# Patient Record
Sex: FEMALE | Race: BLACK OR AFRICAN AMERICAN | Employment: FULL TIME | URBAN - METROPOLITAN AREA
[De-identification: names, ages, dates, MRNs, and addresses within clinical notes are randomized per-mention and may not be internally consistent; named-entity substitution may affect disease eponyms.]

---

## 2014-10-09 LAB — HIV AG/AB: NORMAL

## 2017-05-11 LAB — ANTIBODY: NORMAL

## 2018-12-02 ENCOUNTER — APPOINTMENT (OUTPATIENT)
Dept: GENERAL RADIOLOGY | Age: 33
End: 2018-12-02
Payer: COMMERCIAL

## 2018-12-02 ENCOUNTER — APPOINTMENT (OUTPATIENT)
Dept: CT IMAGING | Age: 33
End: 2018-12-02
Payer: COMMERCIAL

## 2018-12-02 ENCOUNTER — HOSPITAL ENCOUNTER (EMERGENCY)
Age: 33
Discharge: HOME | End: 2018-12-03
Attending: EMERGENCY MEDICINE
Payer: COMMERCIAL

## 2018-12-02 VITALS
SYSTOLIC BLOOD PRESSURE: 107 MMHG | TEMPERATURE: 98 F | WEIGHT: 139.77 LBS | BODY MASS INDEX: 23.86 KG/M2 | RESPIRATION RATE: 18 BRPM | DIASTOLIC BLOOD PRESSURE: 72 MMHG | OXYGEN SATURATION: 100 % | HEART RATE: 96 BPM | HEIGHT: 64 IN

## 2018-12-02 DIAGNOSIS — M54.2 CERVICAL PAIN: ICD-10-CM

## 2018-12-02 DIAGNOSIS — R55 SYNCOPE, UNSPECIFIED SYNCOPE TYPE: Primary | ICD-10-CM

## 2018-12-02 LAB
A/G RATIO: 1.1 (ref 1.1–2.2)
ALBUMIN SERPL-MCNC: 4 G/DL (ref 3.4–5)
ALP BLD-CCNC: 39 U/L (ref 40–129)
ALT SERPL-CCNC: 9 U/L (ref 10–40)
ANION GAP SERPL CALCULATED.3IONS-SCNC: 14 MMOL/L (ref 3–16)
AST SERPL-CCNC: 12 U/L (ref 15–37)
BILIRUB SERPL-MCNC: 0.6 MG/DL (ref 0–1)
BUN BLDV-MCNC: 13 MG/DL (ref 7–20)
CALCIUM SERPL-MCNC: 9.1 MG/DL (ref 8.3–10.6)
CHLORIDE BLD-SCNC: 104 MMOL/L (ref 99–110)
CO2: 20 MMOL/L (ref 21–32)
CREAT SERPL-MCNC: 0.8 MG/DL (ref 0.6–1.1)
GFR AFRICAN AMERICAN: >60
GFR NON-AFRICAN AMERICAN: >60
GLOBULIN: 3.8 G/DL
GLUCOSE BLD-MCNC: 99 MG/DL (ref 70–99)
HCG QUALITATIVE: NEGATIVE
HCT VFR BLD CALC: 38.4 % (ref 36–48)
HEMOGLOBIN: 12.6 G/DL (ref 12–16)
MCH RBC QN AUTO: 30.1 PG (ref 26–34)
MCHC RBC AUTO-ENTMCNC: 32.8 G/DL (ref 31–36)
MCV RBC AUTO: 91.7 FL (ref 80–100)
PDW BLD-RTO: 12.9 % (ref 12.4–15.4)
PLATELET # BLD: 258 K/UL (ref 135–450)
PMV BLD AUTO: 8.8 FL (ref 5–10.5)
POTASSIUM SERPL-SCNC: 3.5 MMOL/L (ref 3.5–5.1)
RBC # BLD: 4.19 M/UL (ref 4–5.2)
SODIUM BLD-SCNC: 138 MMOL/L (ref 136–145)
TOTAL PROTEIN: 7.8 G/DL (ref 6.4–8.2)
TROPONIN: <0.01 NG/ML
WBC # BLD: 7.3 K/UL (ref 4–11)

## 2018-12-02 PROCEDURE — 2580000003 HC RX 258: Performed by: PHYSICIAN ASSISTANT

## 2018-12-02 PROCEDURE — 96361 HYDRATE IV INFUSION ADD-ON: CPT

## 2018-12-02 PROCEDURE — 80053 COMPREHEN METABOLIC PANEL: CPT

## 2018-12-02 PROCEDURE — 99284 EMERGENCY DEPT VISIT MOD MDM: CPT

## 2018-12-02 PROCEDURE — 71046 X-RAY EXAM CHEST 2 VIEWS: CPT

## 2018-12-02 PROCEDURE — 84703 CHORIONIC GONADOTROPIN ASSAY: CPT

## 2018-12-02 PROCEDURE — 96374 THER/PROPH/DIAG INJ IV PUSH: CPT

## 2018-12-02 PROCEDURE — 96375 TX/PRO/DX INJ NEW DRUG ADDON: CPT

## 2018-12-02 PROCEDURE — 70450 CT HEAD/BRAIN W/O DYE: CPT

## 2018-12-02 PROCEDURE — 84484 ASSAY OF TROPONIN QUANT: CPT

## 2018-12-02 PROCEDURE — 72125 CT NECK SPINE W/O DYE: CPT

## 2018-12-02 PROCEDURE — 85027 COMPLETE CBC AUTOMATED: CPT

## 2018-12-02 PROCEDURE — 93005 ELECTROCARDIOGRAM TRACING: CPT | Performed by: EMERGENCY MEDICINE

## 2018-12-02 PROCEDURE — 6360000002 HC RX W HCPCS: Performed by: PHYSICIAN ASSISTANT

## 2018-12-02 RX ORDER — NAPROXEN 500 MG/1
500 TABLET ORAL 2 TIMES DAILY WITH MEALS
Qty: 20 TABLET | Refills: 0 | Status: SHIPPED | OUTPATIENT
Start: 2018-12-02 | End: 2018-12-12

## 2018-12-02 RX ORDER — ONDANSETRON 2 MG/ML
4 INJECTION INTRAMUSCULAR; INTRAVENOUS ONCE
Status: COMPLETED | OUTPATIENT
Start: 2018-12-02 | End: 2018-12-02

## 2018-12-02 RX ORDER — 0.9 % SODIUM CHLORIDE 0.9 %
1000 INTRAVENOUS SOLUTION INTRAVENOUS ONCE
Status: COMPLETED | OUTPATIENT
Start: 2018-12-02 | End: 2018-12-02

## 2018-12-02 RX ORDER — KETOROLAC TROMETHAMINE 30 MG/ML
15 INJECTION, SOLUTION INTRAMUSCULAR; INTRAVENOUS ONCE
Status: COMPLETED | OUTPATIENT
Start: 2018-12-02 | End: 2018-12-02

## 2018-12-02 RX ORDER — DIPHENHYDRAMINE HYDROCHLORIDE 50 MG/ML
25 INJECTION INTRAMUSCULAR; INTRAVENOUS ONCE
Status: COMPLETED | OUTPATIENT
Start: 2018-12-02 | End: 2018-12-02

## 2018-12-02 RX ORDER — CYCLOBENZAPRINE HCL 10 MG
10 TABLET ORAL NIGHTLY PRN
Qty: 10 TABLET | Refills: 0 | Status: SHIPPED | OUTPATIENT
Start: 2018-12-02

## 2018-12-02 RX ORDER — ORPHENADRINE CITRATE 30 MG/ML
30 INJECTION INTRAMUSCULAR; INTRAVENOUS ONCE
Status: COMPLETED | OUTPATIENT
Start: 2018-12-02 | End: 2018-12-02

## 2018-12-02 RX ADMIN — SODIUM CHLORIDE 1000 ML: 9 INJECTION, SOLUTION INTRAVENOUS at 22:35

## 2018-12-02 RX ADMIN — ONDANSETRON 4 MG: 2 INJECTION INTRAMUSCULAR; INTRAVENOUS at 22:56

## 2018-12-02 RX ADMIN — KETOROLAC TROMETHAMINE 15 MG: 30 INJECTION, SOLUTION INTRAMUSCULAR at 22:37

## 2018-12-02 RX ADMIN — DIPHENHYDRAMINE HYDROCHLORIDE 25 MG: 50 INJECTION, SOLUTION INTRAMUSCULAR; INTRAVENOUS at 22:42

## 2018-12-02 RX ADMIN — ORPHENADRINE CITRATE 30 MG: 30 INJECTION INTRAMUSCULAR; INTRAVENOUS at 22:39

## 2018-12-02 ASSESSMENT — PAIN DESCRIPTION - ORIENTATION: ORIENTATION: LEFT

## 2018-12-02 ASSESSMENT — PAIN DESCRIPTION - PAIN TYPE: TYPE: ACUTE PAIN

## 2018-12-02 ASSESSMENT — PAIN DESCRIPTION - LOCATION: LOCATION: NECK;SHOULDER

## 2018-12-02 ASSESSMENT — PAIN SCALES - GENERAL
PAINLEVEL_OUTOF10: 8
PAINLEVEL_OUTOF10: 8
PAINLEVEL_OUTOF10: 3

## 2018-12-03 ASSESSMENT — ENCOUNTER SYMPTOMS
NAUSEA: 0
VOMITING: 0
SHORTNESS OF BREATH: 0
COLOR CHANGE: 0
ABDOMINAL PAIN: 0
BACK PAIN: 0

## 2018-12-03 NOTE — ED NOTES
Bed: A-17  Expected date:   Expected time:   Means of arrival:   Comments:  Green  35F  Syncope & Weakness     Sanjay Hoang, ANN  12/02/18 2837

## 2018-12-03 NOTE — ED TRIAGE NOTES
C/o left shoulder and neck pain for 2 days. States stood up and passed out. States felt weak, diaphoretic and nauseous beforehand.

## 2018-12-06 PROCEDURE — 93010 ELECTROCARDIOGRAM REPORT: CPT | Performed by: INTERNAL MEDICINE

## 2018-12-26 LAB
EKG ATRIAL RATE: 75 BPM
EKG DIAGNOSIS: NORMAL
EKG P AXIS: 65 DEGREES
EKG P-R INTERVAL: 132 MS
EKG Q-T INTERVAL: 370 MS
EKG QRS DURATION: 76 MS
EKG QTC CALCULATION (BAZETT): 413 MS
EKG R AXIS: 49 DEGREES
EKG T AXIS: 18 DEGREES
EKG VENTRICULAR RATE: 75 BPM

## 2020-11-03 LAB — PAP SMEAR, EXTERNAL: NORMAL

## 2022-04-11 ENCOUNTER — HOSPITAL ENCOUNTER (EMERGENCY)
Age: 37
Discharge: HOME | End: 2022-04-11
Attending: EMERGENCY MEDICINE
Payer: COMMERCIAL

## 2022-04-11 VITALS
HEIGHT: 64 IN | TEMPERATURE: 98.3 F | SYSTOLIC BLOOD PRESSURE: 108 MMHG | HEART RATE: 90 BPM | RESPIRATION RATE: 16 BRPM | DIASTOLIC BLOOD PRESSURE: 71 MMHG | BODY MASS INDEX: 25.52 KG/M2 | WEIGHT: 149.47 LBS | OXYGEN SATURATION: 100 %

## 2022-04-11 DIAGNOSIS — R60.9 DEPENDENT EDEMA: Primary | ICD-10-CM

## 2022-04-11 PROCEDURE — 99283 EMERGENCY DEPT VISIT LOW MDM: CPT

## 2022-04-11 ASSESSMENT — ENCOUNTER SYMPTOMS
COLOR CHANGE: 1
EYE PAIN: 0
EYE REDNESS: 0
WHEEZING: 0
SHORTNESS OF BREATH: 0
COUGH: 0

## 2022-04-11 NOTE — ED PROVIDER NOTES
ED Attending Attestation Note    This patient was seen by the advanced practice provider. I personally saw the patient and performed a substantive portion of the visit including all aspects of the medical decision making. Briefly, 43 y.o. female presents with complaints of swelling of the ankles as well as intermittent heaviness of the ankles. States that she is been dealing with swollen feet and ankles over the past 2 years. 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 Denies any pain currently. Denies any unilateral swelling. No fevers or chills. Focused exam:   Gen: 43 y.o. female, NAD  HEENT: NCAT. PERRL. EOMI. CV: RRR w/o MRG  Lungs: CTAB. No incr WOB. Abdomen: Soft, nontender, nondistended. No rebound/guarding. MSK: 1+ edema bilateral ankles, chronic skin changes of the ankles from peripheral vascular disease, DP pulse 2+ bilaterally, no calf tenderness bilaterally    MDM:   Patient seen and evaluated. History and physical as above. Nontoxic afebrile. Patient with bilateral lower extremity swelling intermittently over the past 2 years. Does have some chronic skin changes on the ankles bilaterally likely from peripheral vascular disease and leaky veins. Discussed the importance of elevating her legs when she is off of her feet as well as compression stockings throughout the day. Also stressed importance of follow-up with her primary care physician or with vascular surgery if she chooses to do so. Return instruction provided. Questions answered prior to discharge. For further details of the patient's emergency department visit, please see the advanced practice provider's documentation. Cora Miller MD     This report has been produced using speech recognition software and may contain errors related to that system including errors in grammar, punctuation, and spelling, as well as words and phrases that may be inappropriate.  If there are any questions or concerns please feel free to contact the dictating provider for clarification.         Julio Cesar Mina MD  04/11/22 5528

## 2022-04-11 NOTE — ED PROVIDER NOTES
[facility]        Pt Name: Herb Petersen  MRN: [id number]  [place] 1985  Date of evaluation: 4/11/2022  Provider: Portillo Mcgregor PA-C  PCP: No primary care provider on file. Note Started: 7:44 PM EDT       I have seen and evaluated this patient with my supervising physician Senait Lugo 113       Chief Complaint   Patient presents with    Leg Pain         HISTORY OF PRESENT ILLNESS   (Location/Symptom, Timing/Onset, Context/Setting, Quality, Duration, Modifying Factors, Severity)  Note limiting factors. Chief Complaint: Right leg stiffness, change in skin color    Herb Petersen is a 43 y.o. female who presents to the emergency department with complaint of right ankle stiffness that has been present for the past 2 weeks. She states that she tried to make an appointment to follow-up with her primary care physician, however she could not get in and that is what brings her to the emergency department today. She also indicates that there has been a change in her skin color that has been present for about the same amount of time as the skin change. She does have bilateral leg stiffness. 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 At this time she denies fever, chills, pain in her leg. Nursing Notes were all reviewed and agreed with or any disagreements were addressed in the HPI. REVIEW OF SYSTEMS    (2-9 systems for level 4, 10 or more for level 5)     Review of Systems   Constitutional: Negative for chills and fever. Eyes: Negative for pain and redness. Respiratory: Negative for cough, shortness of breath and wheezing. Cardiovascular: Negative for leg swelling. Musculoskeletal: Negative for gait problem and joint swelling. Skin: Positive for color change. Negative for rash and wound.        PAST MEDICAL HISTORY     Past Medical History:   Diagnosis Date  Acne     Asthma     Dysuria     Nausea     Unspecified contraceptive management        SURGICAL HISTORY   History reviewed. No pertinent surgical history. CURRENTMEDICATIONS       Previous Medications    CYCLOBENZAPRINE (FLEXERIL) 10 MG TABLET    Take 1 tablet by mouth nightly as needed for Muscle spasms Sedation precautions    NAPROXEN (NAPROSYN) 500 MG TABLET    Take 1 tablet by mouth 2 times daily (with meals) for 20 doses Do not take with ibuprofen or other NSAIDs or anti-inflammatories       ALLERGIES     Patient has no known allergies. FAMILYHISTORY     History reviewed. No pertinent family history.      SOCIAL HISTORY       Social History     Socioeconomic History    Marital status:      Spouse name: None    Number of children: 1    Years of education: None    Highest education level: None   Occupational History    None   Tobacco Use    Smoking status: Never Smoker    Smokeless tobacco: Never Used   Substance and Sexual Activity    Alcohol use: Yes     Comment: social    Drug use: No    Sexual activity: None   Other Topics Concern    None   Social History Narrative    Past Medical History      Onset of First Menses at Age: 13, : 1, Para: 1, Miscarriage(s): 0, (s): 0, # Vaginal Deliveries: 1                                        Last updated by Maliha Torrez MD on 2010 3:46:58 PM             Past Surgical History                                                                         Social History     Marital Status: engaged,   Children: 1,   Employment Status: employed full-time,   Employer: [employer],   Occupation: truancy officer    Alcohol Use: socially    weekends    Drug Use: none    Tobacco Usage:non-smoker                                                        Last updated by Maliha Torrez MD on 2010 3:46:58 PM             Family History     mother -     father -         brother - Lenin Mayen -         sister - Andrey Bailey"        daughter - 2006 - Colleen Garza -                                           Last updated by Lilian Briscoe MD on 04/13/2010 3:46:58 PM                         Social Determinants of Health     Financial Resource Strain:     Difficulty of Paying Living Expenses: Not on file   Food Insecurity:     Worried About Running Out of Food in the Last Year: Not on file    Remington of Food in the Last Year: Not on file   Transportation Needs:     Lack of Transportation (Medical): Not on file    Lack of Transportation (Non-Medical): Not on file   Physical Activity:     Days of Exercise per Week: Not on file    Minutes of Exercise per Session: Not on file   Stress:     Feeling of Stress : Not on file   Social Connections:     Frequency of Communication with Friends and Family: Not on file    Frequency of Social Gatherings with Friends and Family: Not on file    Attends Tenriism Services: Not on file    Active Member of [address] [place] [address] [employer] or Organizations: Not on file    Attends Club or Organization Meetings: Not on file    Marital Status: Not on file   Intimate Partner Violence:     Fear of Current or Ex-Partner: Not on file    Emotionally Abused: Not on file    Physically Abused: Not on file    Sexually Abused: Not on file   Housing Stability:     Unable to Pay for Housing in the Last Year: Not on file    Number of [place] in the Last Year: Not on file    Unstable Housing in the Last Year: Not on file       SCREENINGS    Troy Coma Scale  Eye Opening: Spontaneous  Best Verbal Response: Oriented  Best Motor Response: Obeys commands  Troy Coma Scale Score: 15        PHYSICAL EXAM    (up to 7 for level 4, 8 or more for level 5)     ED Triage Vitals [04/11/22 1859]   BP Temp Temp Source Pulse Resp SpO2 Height Weight   108/71 98.3 °F (36.8 °C) Temporal 90 16 100 % 5' 4\" (1.626 m) 149 lb 7.6 oz (67.8 kg)       Physical Exam  Constitutional:       General: She is not in acute distress. Appearance: Normal appearance.  She is not ill-appearing, toxic-appearing or diaphoretic. HENT:      Head: Normocephalic and atraumatic. Right Ear: External ear normal.      Left Ear: External ear normal.      Nose: Nose normal.   Eyes:      General:         Right eye: No discharge. Left eye: No discharge. Pulmonary:      Effort: Pulmonary effort is normal. No respiratory distress. Musculoskeletal:         General: Normal range of motion. Cervical back: Normal range of motion. Comments: Patient has normal active range of motion and strength against resistance in bilateral ankles  Normal sensation distally, normal dorsalis pedis pulse bilaterally  Patient has mild edema to bilateral ankles  + Skin change in right ankle, mottling of skin consistent with peripheral vascular disease    Skin:     General: Skin is warm and dry. Neurological:      General: No focal deficit present. Mental Status: She is alert and oriented to person, place, and time. Psychiatric:         Mood and Affect: Mood normal.         Behavior: Behavior normal.         DIAGNOSTIC RESULTS   LABS:    Labs Reviewed - No data to display    When ordered, only abnormal lab results are displayed. All other labs were within normal range or not returned as of this dictation. EKG: When ordered, EKG's are interpreted by the Emergency Department Physician in the absence of a cardiologist.  Please see their note for interpretation of EKG. RADIOLOGY:   Non-plain film images such as CT, Ultrasound and MRI are read by the radiologist. Plain radiographic images are visualized andpreliminarily interpreted by the  ED Provider with the below findings:        Interpretation perthe Radiologist below, if available at the time of this note:    No orders to display     No results found.       PROCEDURES   Unless otherwise noted below, none     Procedures    CRITICAL CARE TIME   N/A    CONSULTS:  None      EMERGENCY DEPARTMENT COURSE and DIFFERENTIAL DIAGNOSIS/MDM: Vitals:    Vitals:    04/11/22 1859   BP: 108/71   Pulse: 90   Resp: 16   Temp: 98.3 °F (36.8 °C)   TempSrc: Temporal   SpO2: 100%   Weight: 149 lb 7.6 oz (67.8 kg)   Height: 5' 4\" (1.626 m)       Patient was given thefollowing medications:  Medications - No data to display        This is a 13-year-old female presents emergency department with complaint of bilateral ankle stiffness, skin changes in right ankle that is been present for the past 2 weeks. Vitals upon arrival are within normal limits. I did discuss this patient with my attending, Dr. 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 I discussed with patient wearing compression stockings as well as keeping her legs elevated at nighttime and she was agreeable to this plan. I did give her a referral for a vascular surgeon as well as a primary care physician as she is not fond of her PCP at this point. We discussed return to the emergency department if any new worsening or more concerning symptoms present. Patient was agreeable to this plan. She was discharged in stable condition. At this time I have low suspicion for life-threatening disease, including DVT as patient is Wells score is negative 2, other. FINAL IMPRESSION      1. Dependent edema          DISPOSITION/PLAN   DISPOSITION Decision To Discharge 04/11/2022 07:35:51 PM      PATIENT REFERREDTO:  [facility]) Pre-Services  [phone]  Schedule an appointment as soon as possible for a visit in 2 days  for reevaluation    Alison Moreno MD  [facility].  [address]    Schedule an appointment as soon as possible for a visit in 2 days  for reevaluation    [address] [facility] Emergency Department  [address] [address]  [phone]  Go to   As needed, If symptoms worsen      DISCHARGE MEDICATIONS:  New Prescriptions    No medications on file       DISCONTINUED MEDICATIONS:  Discontinued Medications    No medications on file              (Please note that portions ofthis note were completed with a voice recognition program.  Efforts were made to edit the dictations but occasionally words are mis-transcribed.)    Carlos Clark PA-C (electronically signed)              Carlos Clark PA-C  04/11/22 194

## 2023-01-09 ENCOUNTER — TELEPHONE (OUTPATIENT)
Dept: INTERNAL MEDICINE CLINIC | Age: 38
End: 2023-01-09

## 2023-01-09 NOTE — TELEPHONE ENCOUNTER
----- Message from Carlito Shetty Dr sent at 1/6/2023 12:05 PM EST -----  Subject: Appointment Request    Reason for Call: New Patient/New to Provider Appointment needed: New   Patient Request Appointment    QUESTIONS    Reason for appointment request? No appointments available during search     Additional Information for Provider? Patient calling to establish with Dr Hannah Mares.  Please call to discuss appt options  ---------------------------------------------------------------------------  --------------  Neil GREENBERG  [phone]; OK to leave message on voicemail  ---------------------------------------------------------------------------  --------------  SCRIPT ANSWERS  COVID Screen: Stella Doran

## 2023-06-23 ENCOUNTER — OFFICE VISIT (OUTPATIENT)
Dept: PRIMARY CARE CLINIC | Age: 38
End: 2023-06-23
Payer: COMMERCIAL

## 2023-06-23 VITALS
OXYGEN SATURATION: 98 % | WEIGHT: 152 LBS | TEMPERATURE: 97.9 F | HEIGHT: 64 IN | SYSTOLIC BLOOD PRESSURE: 120 MMHG | HEART RATE: 75 BPM | DIASTOLIC BLOOD PRESSURE: 78 MMHG | BODY MASS INDEX: 25.95 KG/M2

## 2023-06-23 DIAGNOSIS — J45.990 ASTHMA, EXERCISE INDUCED: ICD-10-CM

## 2023-06-23 DIAGNOSIS — Z13.220 SCREENING CHOLESTEROL LEVEL: ICD-10-CM

## 2023-06-23 DIAGNOSIS — Z13.1 DIABETES MELLITUS SCREENING: ICD-10-CM

## 2023-06-23 DIAGNOSIS — K59.00 CONSTIPATION, UNSPECIFIED CONSTIPATION TYPE: Primary | ICD-10-CM

## 2023-06-23 DIAGNOSIS — Z11.59 ENCOUNTER FOR HEPATITIS C SCREENING TEST FOR LOW RISK PATIENT: ICD-10-CM

## 2023-06-23 PROBLEM — R87.810 CERVICAL HIGH RISK HPV (HUMAN PAPILLOMAVIRUS) TEST POSITIVE: Status: RESOLVED | Noted: 2019-04-05 | Resolved: 2023-06-23

## 2023-06-23 PROBLEM — R87.810 CERVICAL HIGH RISK HPV (HUMAN PAPILLOMAVIRUS) TEST POSITIVE: Status: ACTIVE | Noted: 2019-04-05

## 2023-06-23 PROCEDURE — 99204 OFFICE O/P NEW MOD 45 MIN: CPT | Performed by: FAMILY MEDICINE

## 2023-06-23 RX ORDER — ALBUTEROL SULFATE 90 UG/1
2 AEROSOL, METERED RESPIRATORY (INHALATION) 4 TIMES DAILY PRN
Qty: 54 G | Refills: 1 | Status: SHIPPED | OUTPATIENT
Start: 2023-06-23

## 2023-06-23 SDOH — ECONOMIC STABILITY: INCOME INSECURITY: HOW HARD IS IT FOR YOU TO PAY FOR THE VERY BASICS LIKE FOOD, HOUSING, MEDICAL CARE, AND HEATING?: NOT HARD AT ALL

## 2023-06-23 SDOH — ECONOMIC STABILITY: FOOD INSECURITY: WITHIN THE PAST 12 MONTHS, THE FOOD YOU BOUGHT JUST DIDN'T LAST AND YOU DIDN'T HAVE MONEY TO GET MORE.: NEVER TRUE

## 2023-06-23 SDOH — ECONOMIC STABILITY: FOOD INSECURITY: WITHIN THE PAST 12 MONTHS, YOU WORRIED THAT YOUR FOOD WOULD RUN OUT BEFORE YOU GOT MONEY TO BUY MORE.: NEVER TRUE

## 2023-06-23 SDOH — ECONOMIC STABILITY: HOUSING INSECURITY
IN THE LAST 12 MONTHS, WAS THERE A TIME WHEN YOU DID NOT HAVE A STEADY PLACE TO SLEEP OR SLEPT IN A SHELTER (INCLUDING NOW)?: NO

## 2023-06-23 ASSESSMENT — ENCOUNTER SYMPTOMS
ABDOMINAL PAIN: 0
RHINORRHEA: 0
VOMITING: 0
COUGH: 0
COLOR CHANGE: 0
DIARRHEA: 0
NAUSEA: 0
SHORTNESS OF BREATH: 0
CONSTIPATION: 1
BLOOD IN STOOL: 0

## 2023-06-23 ASSESSMENT — PATIENT HEALTH QUESTIONNAIRE - PHQ9
SUM OF ALL RESPONSES TO PHQ QUESTIONS 1-9: 0
1. LITTLE INTEREST OR PLEASURE IN DOING THINGS: 0
SUM OF ALL RESPONSES TO PHQ9 QUESTIONS 1 & 2: 0
SUM OF ALL RESPONSES TO PHQ QUESTIONS 1-9: 0
2. FEELING DOWN, DEPRESSED OR HOPELESS: 0

## 2023-06-23 NOTE — ASSESSMENT & PLAN NOTE
Poorly controlled and has tried most of the measures I would recommend already. No alarm sx. Possibly IBS-C but needs eval (gastroparesis, functional issue, mass less likely but on DDx). W Will refer to GI for eval - may need colonoscopy. TSH check today as well and do preventative labs to r/o diabetes etc. Call back/ED precautions discussed.

## 2023-06-23 NOTE — PROGRESS NOTES
Aurea Alvarado is a 42y.o. year old female here for:    Chief Complaint:    Chief Complaint   Patient presents with    Constipation     Subjective: Today, her current concerns include:    HPI:  #Constipation  - Onset: All her life she feels  - Context: Denied person or fam hx of colon CA. Has changed diet, eliminated dairy  - Quality: No pain or blood with BM.   - Severity: No pain  - Timing/Duration: Constant and daily - BM once per week  - Progression: Worsening  - Modifiers: Fiber, Miralax, Metamucil, water (80 oz per day or so). Working out 60 mins per day for 6 days per week. Occasionally uses laxatives. - Associated Symptoms: Per ROS as otherwise stated in this note    Past Medical History:   Diagnosis Date    Acne     Asthma     Exercise induced    Cervical high risk HPV (human papillomavirus) test positive 4/5/2019    Dysuria     Nausea     Unspecified contraceptive management      Social History     Tobacco Use    Smoking status: Never    Smokeless tobacco: Never   Substance Use Topics    Alcohol use: Not Currently    Drug use: No     Family History   Problem Relation Age of Onset    Breast Cancer Neg Hx     Colon Cancer Neg Hx      Past Surgical History:   Procedure Laterality Date    TUBAL LIGATION      WISDOM TOOTH EXTRACTION           Current Outpatient Medications:     albuterol sulfate HFA (VENTOLIN HFA) 108 (90 Base) MCG/ACT inhaler, Inhale 2 puffs into the lungs 4 times daily as needed for Wheezing, Disp: 54 g, Rfl: 1    cyclobenzaprine (FLEXERIL) 10 MG tablet, Take 1 tablet by mouth nightly as needed for Muscle spasms Sedation precautions, Disp: 10 tablet, Rfl: 0  No Known Allergies    Review of Systems:  Review of Systems   Constitutional:  Negative for chills, diaphoresis (enied night sweats), fever and unexpected weight change (denied weight loss). HENT:  Negative for congestion and rhinorrhea. Respiratory:  Negative for cough and shortness of breath.     Cardiovascular:  Negative

## 2023-06-23 NOTE — PATIENT INSTRUCTIONS
Please consider pneumonia vaccine. Please find our [facility] below for your convenience! CENTRAL LOCATIONS    1) [address], [address]  Phone: [phone]    2) [address]  [facility], [address]  Phone: [place]    3) [address], [address]  [address]  Phone: [phone]    4) [facility], [address]  Phone: [phone]    5) [facility] [address] [facility] [address]  [address]ena Leon [address]  Phone: [address]    6) [address] [facility], [address] [facility], [address]  Phone: [phone]    7) Lucas [address] [facility] [address]  Phone: [phone]) [facility] [address]  [place], [address]  Phone: [phone]) [address] [place] [address] [address], [address]. [place] [address] [facility]  Phone: [place]    10) [facility]  [address] [facility] [address] [address], [address] [facility]  Phone: [phone]) [address]  3/3/2003 [address] [facility], [address] [facility] [address]  Phone: [phone] [facility]    12) [address] [facility] [address], [address]  [address], [address] [facility]  Phone: [phone]    13) [facility]   [address]  Dionne Holiday, [address]  Phone: [phone]

## 2023-06-23 NOTE — ASSESSMENT & PLAN NOTE
Well controlled. Denied recent H/I/E. Declined PCV 20 offered. Refilled inhaler per orders. Call back/ED precautions discussed.

## 2023-07-06 ENCOUNTER — TELEPHONE (OUTPATIENT)
Dept: PRIMARY CARE CLINIC | Age: 38
End: 2023-07-06

## 2023-07-06 NOTE — TELEPHONE ENCOUNTER
Fax received from [facility] stating they have attempted to reach patient leaving several messages but patient have not followed up regarding referral.     Telephone call placed to patient, no answer. LVM instructing patient to return call.

## 2023-07-23 PROBLEM — Z13.1 DIABETES MELLITUS SCREENING: Status: RESOLVED | Noted: 2023-06-23 | Resolved: 2023-07-23

## 2023-07-23 PROBLEM — Z13.220 SCREENING CHOLESTEROL LEVEL: Status: RESOLVED | Noted: 2023-06-23 | Resolved: 2023-07-23

## 2023-07-23 PROBLEM — Z11.59 ENCOUNTER FOR HEPATITIS C SCREENING TEST FOR LOW RISK PATIENT: Status: RESOLVED | Noted: 2023-06-23 | Resolved: 2023-07-23

## 2023-10-13 DIAGNOSIS — Z11.59 ENCOUNTER FOR HEPATITIS C SCREENING TEST FOR LOW RISK PATIENT: ICD-10-CM

## 2023-10-13 DIAGNOSIS — K59.00 CONSTIPATION, UNSPECIFIED CONSTIPATION TYPE: ICD-10-CM

## 2023-10-13 DIAGNOSIS — Z13.220 SCREENING CHOLESTEROL LEVEL: ICD-10-CM

## 2023-10-13 DIAGNOSIS — Z13.1 DIABETES MELLITUS SCREENING: ICD-10-CM

## 2023-10-13 LAB
ALBUMIN SERPL-MCNC: 4.6 G/DL (ref 3.4–5)
ALBUMIN/GLOB SERPL: 1.4 {RATIO} (ref 1.1–2.2)
ALP SERPL-CCNC: 49 U/L (ref 40–129)
ALT SERPL-CCNC: 9 U/L (ref 10–40)
ANION GAP SERPL CALCULATED.3IONS-SCNC: 15 MMOL/L (ref 3–16)
AST SERPL-CCNC: 15 U/L (ref 15–37)
BILIRUB SERPL-MCNC: 0.8 MG/DL (ref 0–1)
BUN SERPL-MCNC: 12 MG/DL (ref 7–20)
CALCIUM SERPL-MCNC: 9 MG/DL (ref 8.3–10.6)
CHLORIDE SERPL-SCNC: 100 MMOL/L (ref 99–110)
CHOLEST SERPL-MCNC: 245 MG/DL (ref 0–199)
CO2 SERPL-SCNC: 23 MMOL/L (ref 21–32)
CREAT SERPL-MCNC: 0.9 MG/DL (ref 0.6–1.1)
GFR SERPLBLD CREATININE-BSD FMLA CKD-EPI: >60 ML/MIN/{1.73_M2}
GLUCOSE SERPL-MCNC: 78 MG/DL (ref 70–99)
HCV AB SERPL QL IA: NORMAL
HDLC SERPL-MCNC: 70 MG/DL (ref 40–60)
LDLC SERPL CALC-MCNC: 164 MG/DL
POTASSIUM SERPL-SCNC: 4 MMOL/L (ref 3.5–5.1)
PROT SERPL-MCNC: 7.9 G/DL (ref 6.4–8.2)
SODIUM SERPL-SCNC: 138 MMOL/L (ref 136–145)
TRIGL SERPL-MCNC: 53 MG/DL (ref 0–150)
TSH SERPL DL<=0.005 MIU/L-ACNC: 3.33 UIU/ML (ref 0.27–4.2)
VLDLC SERPL CALC-MCNC: 11 MG/DL

## 2023-10-14 LAB
EST. AVERAGE GLUCOSE BLD GHB EST-MCNC: 91.1 MG/DL
HBA1C MFR BLD: 4.8 %

## 2024-04-05 DIAGNOSIS — L60.8 DEFORMITY OF TOENAIL: Primary | ICD-10-CM

## 2024-06-10 ENCOUNTER — OFFICE VISIT (OUTPATIENT)
Dept: PRIMARY CARE CLINIC | Age: 39
End: 2024-06-10
Payer: COMMERCIAL

## 2024-06-10 VITALS
SYSTOLIC BLOOD PRESSURE: 102 MMHG | BODY MASS INDEX: 25.03 KG/M2 | HEART RATE: 71 BPM | HEIGHT: 64 IN | TEMPERATURE: 98.6 F | DIASTOLIC BLOOD PRESSURE: 62 MMHG | RESPIRATION RATE: 17 BRPM | WEIGHT: 146.6 LBS | OXYGEN SATURATION: 99 %

## 2024-06-10 DIAGNOSIS — Z12.31 ENCOUNTER FOR SCREENING MAMMOGRAM FOR MALIGNANT NEOPLASM OF BREAST: ICD-10-CM

## 2024-06-10 DIAGNOSIS — Z11.59 NEED FOR HEPATITIS B SCREENING TEST: ICD-10-CM

## 2024-06-10 DIAGNOSIS — R23.3 EASY BRUISING: Primary | ICD-10-CM

## 2024-06-10 PROBLEM — K59.00 CONSTIPATION: Status: RESOLVED | Noted: 2023-06-23 | Resolved: 2024-06-10

## 2024-06-10 PROCEDURE — 99214 OFFICE O/P EST MOD 30 MIN: CPT | Performed by: FAMILY MEDICINE

## 2024-06-10 SDOH — ECONOMIC STABILITY: INCOME INSECURITY: HOW HARD IS IT FOR YOU TO PAY FOR THE VERY BASICS LIKE FOOD, HOUSING, MEDICAL CARE, AND HEATING?: NOT HARD AT ALL

## 2024-06-10 SDOH — ECONOMIC STABILITY: FOOD INSECURITY: WITHIN THE PAST 12 MONTHS, YOU WORRIED THAT YOUR FOOD WOULD RUN OUT BEFORE YOU GOT MONEY TO BUY MORE.: NEVER TRUE

## 2024-06-10 SDOH — ECONOMIC STABILITY: FOOD INSECURITY: WITHIN THE PAST 12 MONTHS, THE FOOD YOU BOUGHT JUST DIDN'T LAST AND YOU DIDN'T HAVE MONEY TO GET MORE.: NEVER TRUE

## 2024-06-10 ASSESSMENT — ENCOUNTER SYMPTOMS
ABDOMINAL PAIN: 0
SHORTNESS OF BREATH: 0
BLOOD IN STOOL: 0
DIARRHEA: 0
RHINORRHEA: 0
COLOR CHANGE: 0
COUGH: 0
VOMITING: 0
NAUSEA: 0

## 2024-06-10 ASSESSMENT — PATIENT HEALTH QUESTIONNAIRE - PHQ9
SUM OF ALL RESPONSES TO PHQ QUESTIONS 1-9: 0
SUM OF ALL RESPONSES TO PHQ9 QUESTIONS 1 & 2: 0
SUM OF ALL RESPONSES TO PHQ QUESTIONS 1-9: 0
SUM OF ALL RESPONSES TO PHQ QUESTIONS 1-9: 0
2. FEELING DOWN, DEPRESSED OR HOPELESS: NOT AT ALL
1. LITTLE INTEREST OR PLEASURE IN DOING THINGS: NOT AT ALL
SUM OF ALL RESPONSES TO PHQ QUESTIONS 1-9: 0

## 2024-06-10 NOTE — PATIENT INSTRUCTIONS
Please find our [facility] Lab Location Guide below for your convenience!    CENTRAL LOCATIONS    1) [facility]  [address]  [place], [place] [address]  Phone: [phone]    2) [facility]  [address] [place], [place] [address]  Phone: [phone]    [facility] LOCATIONS    3) [facility]  [address]  [place], [place] [address]  Phone: [phone]    4) [facility]  [address] [place] [address]  Phone: [phone]    5) [facility]  [address] [place] [address]  Phone: [phone]    6) [facility]  [address]  [place] [address]   Phone: [phone]    [place] LOCATIONS    7) [facility]  [address]  [place] [address]  Phone: [phone]    8) [facility]  [address] [place] [address]  Phone: [phone]    9) [facility]  [address] [place] [address]  Phone: [phone]    10) [facility]  [address]  [place] [address]  Phone: [phone]    [place] LOCATIONS    11) [facility]  [address]  [place] [address]  Phone: [phone]    12) [facility]  [address] [facility] [address]  [place] [address]  Phone: [phone]    [facility] LOCATIONS    13) [facility]  [address]  [place] [address]  Phone: [phone]    14) [facility]  [address] [place] [address]  Phone: [phone]
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
## 2024-06-10 NOTE — PROGRESS NOTES
Ángel Nieto is a 38 y.o. year old female here for:    Chief Complaint:    Chief Complaint   Patient presents with    Easy Bruising     Subjective:    Today, her current concerns include:    HPI:  #Bruising  - Onset: 1 year or so  - Context: OTC - Vit D3 and K, A, C. Does run and does not hydrate. Denied no personal or known family hx of blood clots or bleeding dyscrasias. Does have a dog at home. Denied recent travel, travel outside the country, no one with similar sx. She works as a teacher. No recent pest infestations or treatment for this at home or work - denied recent bug bites etc.  - Location: Legs only - thighs and lower legs  - Quality: Painless   - Timing/Duration: Once every 2-3 months and resolves without intervention  - Inciting Event: No recalled trauma, or falls.  - Progression: Stable/improving.  - Modifiers: Nothing known makes it better or worse.  - Associated Symptoms: Per ROS as otherwise stated in this note. Denied bleeding gums, recurrent nosebleeds. Denied worsening/heavy periods.  - Previous occurrence: Never before    Past Medical History:   Diagnosis Date    Acne     Asthma     Exercise induced    Cervical high risk HPV (human papillomavirus) test positive 4/5/2019    Dysuria     Nausea     Unspecified contraceptive management      Social History     Tobacco Use    Smoking status: Never    Smokeless tobacco: Never   Substance Use Topics    Alcohol use: Not Currently    Drug use: No     Family History   Problem Relation Age of Onset    Breast Cancer Neg Hx     Colon Cancer Neg Hx      Past Surgical History:   Procedure Laterality Date    TUBAL LIGATION      WISDOM TOOTH EXTRACTION           Current Outpatient Medications:     albuterol sulfate HFA (VENTOLIN HFA) 108 (90 Base) MCG/ACT inhaler, Inhale 2 puffs into the lungs 4 times daily as needed for Wheezing, Disp: 54 g, Rfl: 1  No Known Allergies    Review of Systems:  Review of Systems   Constitutional:  Negative for chills,

## 2024-06-13 ENCOUNTER — HOSPITAL ENCOUNTER (OUTPATIENT)
Dept: MAMMOGRAPHY | Age: 39
Discharge: HOME | End: 2024-06-13
Attending: FAMILY MEDICINE
Payer: COMMERCIAL

## 2024-06-13 VITALS — WEIGHT: 145 LBS | BODY MASS INDEX: 24.75 KG/M2 | HEIGHT: 64 IN

## 2024-06-13 DIAGNOSIS — Z12.31 ENCOUNTER FOR SCREENING MAMMOGRAM FOR MALIGNANT NEOPLASM OF BREAST: ICD-10-CM

## 2024-06-13 PROCEDURE — 77063 BREAST TOMOSYNTHESIS BI: CPT

## 2024-06-18 DIAGNOSIS — Z11.59 NEED FOR HEPATITIS B SCREENING TEST: ICD-10-CM

## 2024-06-18 DIAGNOSIS — R23.3 EASY BRUISING: ICD-10-CM

## 2024-06-18 LAB
ALBUMIN SERPL-MCNC: 4.5 G/DL (ref 3.4–5)
ALBUMIN/GLOB SERPL: 1.4 {RATIO} (ref 1.1–2.2)
ALP SERPL-CCNC: 50 U/L (ref 40–129)
ALT SERPL-CCNC: 13 U/L (ref 10–40)
ANION GAP SERPL CALCULATED.3IONS-SCNC: 11 MMOL/L (ref 3–16)
APTT BLD: 25.9 SEC (ref 22.1–36.4)
AST SERPL-CCNC: 15 U/L (ref 15–37)
BASOPHILS # BLD: 0 K/UL (ref 0–0.2)
BASOPHILS NFR BLD: 0.7 %
BILIRUB SERPL-MCNC: 1.1 MG/DL (ref 0–1)
BUN SERPL-MCNC: 8 MG/DL (ref 7–20)
CALCIUM SERPL-MCNC: 9.3 MG/DL (ref 8.3–10.6)
CHLORIDE SERPL-SCNC: 105 MMOL/L (ref 99–110)
CO2 SERPL-SCNC: 23 MMOL/L (ref 21–32)
CREAT SERPL-MCNC: 0.8 MG/DL (ref 0.6–1.1)
DEPRECATED RDW RBC AUTO: 13.2 % (ref 12.4–15.4)
EOSINOPHIL # BLD: 0 K/UL (ref 0–0.6)
EOSINOPHIL NFR BLD: 0.7 %
GFR SERPLBLD CREATININE-BSD FMLA CKD-EPI: >90 ML/MIN/{1.73_M2}
GLUCOSE SERPL-MCNC: 79 MG/DL (ref 70–99)
HBV SURFACE AB SERPL IA-ACNC: >1000 MIU/ML
HCT VFR BLD AUTO: 35.7 % (ref 36–48)
HGB BLD-MCNC: 11.9 G/DL (ref 12–16)
INR PPP: 1 (ref 0.85–1.15)
LYMPHOCYTES # BLD: 1.6 K/UL (ref 1–5.1)
LYMPHOCYTES NFR BLD: 35.8 %
MCH RBC QN AUTO: 29.9 PG (ref 26–34)
MCHC RBC AUTO-ENTMCNC: 33.3 G/DL (ref 31–36)
MCV RBC AUTO: 89.7 FL (ref 80–100)
MONOCYTES # BLD: 0.3 K/UL (ref 0–1.3)
MONOCYTES NFR BLD: 6.6 %
NEUTROPHILS # BLD: 2.4 K/UL (ref 1.7–7.7)
NEUTROPHILS NFR BLD: 56.2 %
PLATELET # BLD AUTO: 282 K/UL (ref 135–450)
PMV BLD AUTO: 9.7 FL (ref 5–10.5)
POTASSIUM SERPL-SCNC: 3.9 MMOL/L (ref 3.5–5.1)
PROT SERPL-MCNC: 7.7 G/DL (ref 6.4–8.2)
PROTHROMBIN TIME: 13.4 SEC (ref 11.9–14.9)
RBC # BLD AUTO: 3.99 M/UL (ref 4–5.2)
SODIUM SERPL-SCNC: 139 MMOL/L (ref 136–145)
WBC # BLD AUTO: 4.3 K/UL (ref 4–11)

## 2024-06-21 DIAGNOSIS — R17 ELEVATED BILIRUBIN: Primary | ICD-10-CM

## 2024-06-21 DIAGNOSIS — R17 ELEVATED BILIRUBIN: ICD-10-CM

## 2024-06-21 LAB
BILIRUB DIRECT SERPL-MCNC: <0.2 MG/DL (ref 0–0.3)
BILIRUB INDIRECT SERPL-MCNC: NORMAL MG/DL (ref 0–1)
BILIRUB SERPL-MCNC: 1 MG/DL (ref 0–1)

## 2024-07-01 ENCOUNTER — OFFICE VISIT (OUTPATIENT)
Dept: PRIMARY CARE CLINIC | Age: 39
End: 2024-07-01
Payer: COMMERCIAL

## 2024-07-01 VITALS
HEIGHT: 64 IN | BODY MASS INDEX: 24.82 KG/M2 | HEART RATE: 67 BPM | WEIGHT: 145.4 LBS | OXYGEN SATURATION: 98 % | DIASTOLIC BLOOD PRESSURE: 76 MMHG | RESPIRATION RATE: 17 BRPM | TEMPERATURE: 98.4 F | SYSTOLIC BLOOD PRESSURE: 108 MMHG

## 2024-07-01 DIAGNOSIS — Z00.00 HEALTHCARE MAINTENANCE: Primary | ICD-10-CM

## 2024-07-01 DIAGNOSIS — Z13.220 SCREENING FOR CHOLESTEROL LEVEL: ICD-10-CM

## 2024-07-01 PROBLEM — Z12.31 ENCOUNTER FOR SCREENING MAMMOGRAM FOR MALIGNANT NEOPLASM OF BREAST: Status: RESOLVED | Noted: 2024-06-10 | Resolved: 2024-07-01

## 2024-07-01 PROBLEM — Z11.59 NEED FOR HEPATITIS B SCREENING TEST: Status: RESOLVED | Noted: 2024-06-10 | Resolved: 2024-07-01

## 2024-07-01 PROCEDURE — 99396 PREV VISIT EST AGE 40-64: CPT | Performed by: FAMILY MEDICINE

## 2024-07-01 SDOH — ECONOMIC STABILITY: FOOD INSECURITY: WITHIN THE PAST 12 MONTHS, YOU WORRIED THAT YOUR FOOD WOULD RUN OUT BEFORE YOU GOT MONEY TO BUY MORE.: NEVER TRUE

## 2024-07-01 SDOH — ECONOMIC STABILITY: FOOD INSECURITY: WITHIN THE PAST 12 MONTHS, THE FOOD YOU BOUGHT JUST DIDN'T LAST AND YOU DIDN'T HAVE MONEY TO GET MORE.: NEVER TRUE

## 2024-07-01 SDOH — ECONOMIC STABILITY: INCOME INSECURITY: HOW HARD IS IT FOR YOU TO PAY FOR THE VERY BASICS LIKE FOOD, HOUSING, MEDICAL CARE, AND HEATING?: NOT HARD AT ALL

## 2024-07-01 ASSESSMENT — PATIENT HEALTH QUESTIONNAIRE - PHQ9
2. FEELING DOWN, DEPRESSED OR HOPELESS: NOT AT ALL
SUM OF ALL RESPONSES TO PHQ QUESTIONS 1-9: 0
1. LITTLE INTEREST OR PLEASURE IN DOING THINGS: NOT AT ALL
SUM OF ALL RESPONSES TO PHQ9 QUESTIONS 1 & 2: 0
SUM OF ALL RESPONSES TO PHQ QUESTIONS 1-9: 0

## 2024-07-01 ASSESSMENT — ENCOUNTER SYMPTOMS
DIARRHEA: 0
BLOOD IN STOOL: 0
COLOR CHANGE: 0
NAUSEA: 0
ABDOMINAL PAIN: 0
RHINORRHEA: 0
SHORTNESS OF BREATH: 0
COUGH: 0
VOMITING: 0

## 2024-07-01 NOTE — PROGRESS NOTES
Systems:  Review of Systems   Constitutional:  Negative for chills, diaphoresis and fever.   HENT:  Negative for congestion and rhinorrhea.    Respiratory:  Negative for cough and shortness of breath.    Cardiovascular:  Negative for chest pain.   Gastrointestinal:  Negative for abdominal pain, blood in stool, diarrhea, nausea and vomiting.   Genitourinary:  Negative for difficulty urinating and hematuria.   Skin:  Negative for color change and rash.   Neurological:  Negative for dizziness and headaches.   Hematological:  Bruises/bleeds easily (improving).   Psychiatric/Behavioral:  Negative for dysphoric mood and sleep disturbance.      Objective:    Physical Exam:  Vitals:    07/01/24 0739   BP: 108/76   Site: Left Upper Arm   Position: Sitting   Cuff Size: Medium Adult   Pulse: 67   Resp: 17   Temp: 98.4 °F (36.9 °C)   TempSrc: Oral   SpO2: 98%   Weight: 66 kg (145 lb 6.4 oz)   Height: 1.626 m (5' 4\")     Physical Exam  Constitutional:       General: She is not in acute distress.     Appearance: Normal appearance. She is not ill-appearing, toxic-appearing or diaphoretic.   HENT:      Head: Normocephalic and atraumatic.      Right Ear: External ear normal.      Left Ear: External ear normal.   Eyes:      General: No scleral icterus.  Cardiovascular:      Rate and Rhythm: Normal rate and regular rhythm.      Pulses: Normal pulses.      Heart sounds: Normal heart sounds. No murmur heard.     No friction rub. No gallop.   Pulmonary:      Effort: Pulmonary effort is normal. No respiratory distress.      Breath sounds: Normal breath sounds. No stridor. No wheezing, rhonchi or rales.   Chest:      Chest wall: No tenderness.   Abdominal:      Palpations: Abdomen is soft.   Skin:     General: Skin is warm and dry.      Capillary Refill: Capillary refill takes less than 2 seconds.   Neurological:      Mental Status: She is alert.   Psychiatric:         Mood and Affect: Mood normal.         Behavior: Behavior normal.

## 2024-07-01 NOTE — ASSESSMENT & PLAN NOTE
Overall doing well. Hydration encouraged for the summer (and in general). She reports she drinks a lot of coffee. Easy bruising is improved now.

## 2024-07-01 NOTE — ASSESSMENT & PLAN NOTE
Age/risk appropriate screening provided as per orders below - to be done in 6 months, fasting, given last LDL level. Ordered and reminder set. She was amenable to this plan - will go to lab.

## 2024-07-30 ENCOUNTER — OFFICE VISIT (OUTPATIENT)
Dept: PRIMARY CARE CLINIC | Age: 39
End: 2024-07-30
Payer: COMMERCIAL

## 2024-07-30 VITALS
OXYGEN SATURATION: 98 % | TEMPERATURE: 99.1 F | DIASTOLIC BLOOD PRESSURE: 64 MMHG | WEIGHT: 145.6 LBS | HEIGHT: 64 IN | SYSTOLIC BLOOD PRESSURE: 100 MMHG | BODY MASS INDEX: 24.86 KG/M2 | RESPIRATION RATE: 14 BRPM | HEART RATE: 72 BPM

## 2024-07-30 DIAGNOSIS — J45.990 ASTHMA, EXERCISE INDUCED: ICD-10-CM

## 2024-07-30 DIAGNOSIS — R29.898 WEAKNESS OF BOTH LOWER EXTREMITIES: Primary | ICD-10-CM

## 2024-07-30 PROCEDURE — 99213 OFFICE O/P EST LOW 20 MIN: CPT | Performed by: FAMILY MEDICINE

## 2024-07-30 RX ORDER — ALBUTEROL SULFATE 90 UG/1
2 AEROSOL, METERED RESPIRATORY (INHALATION) 4 TIMES DAILY PRN
Qty: 54 G | Refills: 3 | Status: SHIPPED | OUTPATIENT
Start: 2024-07-30

## 2024-07-30 ASSESSMENT — ENCOUNTER SYMPTOMS
ABDOMINAL PAIN: 0
DIARRHEA: 0
NAUSEA: 0
BLOOD IN STOOL: 0
COUGH: 0
COLOR CHANGE: 0
SHORTNESS OF BREATH: 0
VOMITING: 0
RHINORRHEA: 0
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
## 2024-07-30 NOTE — ASSESSMENT & PLAN NOTE
Well controlled. Denied recent H/I/E. Refilled inhaler per orders. Call back/ED precautions discussed.
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
## 2024-07-31 PROBLEM — Z13.220 SCREENING FOR CHOLESTEROL LEVEL: Status: RESOLVED | Noted: 2024-07-01 | Resolved: 2024-07-31

## 2024-07-31 PROBLEM — Z00.00 HEALTHCARE MAINTENANCE: Status: RESOLVED | Noted: 2024-07-01 | Resolved: 2024-07-31

## 2024-08-05 DIAGNOSIS — R29.898 WEAKNESS OF BOTH LOWER EXTREMITIES: Primary | ICD-10-CM

## 2024-08-21 ENCOUNTER — HOSPITAL ENCOUNTER (OUTPATIENT)
Dept: MRI IMAGING | Age: 39
Discharge: HOME | End: 2024-08-21
Payer: COMMERCIAL

## 2024-08-21 DIAGNOSIS — R29.898 WEAKNESS OF BOTH LOWER EXTREMITIES: ICD-10-CM

## 2024-08-21 PROCEDURE — 72148 MRI LUMBAR SPINE W/O DYE: CPT

## 2025-01-07 ENCOUNTER — TELEPHONE (OUTPATIENT)
Dept: PRIMARY CARE CLINIC | Age: 40
End: 2025-01-07

## 2025-01-07 NOTE — TELEPHONE ENCOUNTER
----- Message from Dr. Gabriela Barry MD sent at 1/7/2025 12:32 PM EST -----  Help please and thank you!  ----- Message -----  From: Gabriela Barry MD  Sent: 1/6/2025   7:00 AM EST  To: Gabriela Barry MD    Call to remind her to get fasting lipids done - ordered and in chart please. Thank you!

## 2025-01-21 ENCOUNTER — TELEPHONE (OUTPATIENT)
Dept: PRIMARY CARE CLINIC | Age: 40
End: 2025-01-21

## 2025-01-21 NOTE — TELEPHONE ENCOUNTER
----- Message from Dr. Gabriela Barry MD sent at 1/21/2025  7:40 AM EST -----  Let's try this again please and thank you!  ----- Message -----  From: Gabriela Barry MD  Sent: 1/6/2025   7:00 AM EST  To: Gabriela Barry MD    Call to remind her to get fasting lipids done - ordered and in chart please. Thank you!

## 2025-02-18 ENCOUNTER — TELEPHONE (OUTPATIENT)
Dept: PRIMARY CARE CLINIC | Age: 40
End: 2025-02-18

## 2025-02-18 NOTE — TELEPHONE ENCOUNTER
----- Message from Dr. Gabriela Barry MD sent at 2/18/2025 10:26 AM EST -----  Help with this please? Thank you!  ----- Message -----  From: Gabriela Barry MD  Sent: 1/6/2025   7:00 AM EST  To: Gabriela Barry MD    Call to remind her to get fasting lipids done - ordered and in chart please. Thank you!

## 2025-03-24 ENCOUNTER — RESULTS FOLLOW-UP (OUTPATIENT)
Dept: MRI IMAGING | Age: 40
End: 2025-03-24

## 2025-03-26 DIAGNOSIS — Z13.220 SCREENING FOR CHOLESTEROL LEVEL: ICD-10-CM

## 2025-03-26 LAB
CHOLEST SERPL-MCNC: 233 MG/DL (ref 0–199)
HDLC SERPL-MCNC: 69 MG/DL (ref 40–60)
LDL CHOLESTEROL: 153 MG/DL
TRIGL SERPL-MCNC: 53 MG/DL (ref 0–150)
VLDLC SERPL CALC-MCNC: 11 MG/DL

## 2025-03-31 ENCOUNTER — RESULTS FOLLOW-UP (OUTPATIENT)
Dept: PRIMARY CARE CLINIC | Age: 40
End: 2025-03-31

## 2025-07-22 ENCOUNTER — HOSPITAL ENCOUNTER (OUTPATIENT)
Dept: MAMMOGRAPHY | Age: 40
Discharge: HOME | End: 2025-07-22
Payer: COMMERCIAL

## 2025-07-22 VITALS — BODY MASS INDEX: 23.39 KG/M2 | WEIGHT: 137 LBS | HEIGHT: 64 IN

## 2025-07-22 DIAGNOSIS — Z12.31 VISIT FOR SCREENING MAMMOGRAM: ICD-10-CM

## 2025-07-22 PROCEDURE — 77063 BREAST TOMOSYNTHESIS BI: CPT

## 2025-07-29 ASSESSMENT — PATIENT HEALTH QUESTIONNAIRE - PHQ9
SUM OF ALL RESPONSES TO PHQ QUESTIONS 1-9: 0
2. FEELING DOWN, DEPRESSED OR HOPELESS: NOT AT ALL
1. LITTLE INTEREST OR PLEASURE IN DOING THINGS: NOT AT ALL
SUM OF ALL RESPONSES TO PHQ QUESTIONS 1-9: 0
1. LITTLE INTEREST OR PLEASURE IN DOING THINGS: NOT AT ALL
SUM OF ALL RESPONSES TO PHQ QUESTIONS 1-9: 0
2. FEELING DOWN, DEPRESSED OR HOPELESS: NOT AT ALL
SUM OF ALL RESPONSES TO PHQ QUESTIONS 1-9: 0
SUM OF ALL RESPONSES TO PHQ9 QUESTIONS 1 & 2: 0

## 2025-08-01 ENCOUNTER — OFFICE VISIT (OUTPATIENT)
Dept: PRIMARY CARE CLINIC | Age: 40
End: 2025-08-01
Payer: COMMERCIAL

## 2025-08-01 VITALS
HEIGHT: 64 IN | HEART RATE: 73 BPM | BODY MASS INDEX: 23.63 KG/M2 | WEIGHT: 138.4 LBS | OXYGEN SATURATION: 99 % | SYSTOLIC BLOOD PRESSURE: 108 MMHG | TEMPERATURE: 98.9 F | DIASTOLIC BLOOD PRESSURE: 72 MMHG | RESPIRATION RATE: 15 BRPM

## 2025-08-01 DIAGNOSIS — Z23 IMMUNIZATION DUE: ICD-10-CM

## 2025-08-01 DIAGNOSIS — Z13.1 DIABETES MELLITUS SCREENING: ICD-10-CM

## 2025-08-01 DIAGNOSIS — Z00.00 HEALTHCARE MAINTENANCE: Primary | ICD-10-CM

## 2025-08-01 DIAGNOSIS — Z13.220 SCREENING CHOLESTEROL LEVEL: ICD-10-CM

## 2025-08-01 PROBLEM — R23.3 EASY BRUISING: Status: RESOLVED | Noted: 2024-06-10 | Resolved: 2025-08-01

## 2025-08-01 PROBLEM — M54.50 LOW BACK PAIN: Status: ACTIVE | Noted: 2024-08-16

## 2025-08-01 PROBLEM — M54.2 NECK PAIN: Status: ACTIVE | Noted: 2024-11-26

## 2025-08-01 PROCEDURE — 90471 IMMUNIZATION ADMIN: CPT | Performed by: FAMILY MEDICINE

## 2025-08-01 PROCEDURE — 90715 TDAP VACCINE 7 YRS/> IM: CPT | Performed by: FAMILY MEDICINE

## 2025-08-01 PROCEDURE — 99396 PREV VISIT EST AGE 40-64: CPT | Performed by: FAMILY MEDICINE

## 2025-08-01 SDOH — ECONOMIC STABILITY: FOOD INSECURITY: WITHIN THE PAST 12 MONTHS, THE FOOD YOU BOUGHT JUST DIDN'T LAST AND YOU DIDN'T HAVE MONEY TO GET MORE.: NEVER TRUE

## 2025-08-01 SDOH — ECONOMIC STABILITY: FOOD INSECURITY: WITHIN THE PAST 12 MONTHS, YOU WORRIED THAT YOUR FOOD WOULD RUN OUT BEFORE YOU GOT MONEY TO BUY MORE.: NEVER TRUE

## 2025-08-01 ASSESSMENT — ENCOUNTER SYMPTOMS
VOMITING: 0
BLOOD IN STOOL: 0
RHINORRHEA: 0
NAUSEA: 0
COLOR CHANGE: 0
COUGH: 0
DIARRHEA: 0
SHORTNESS OF BREATH: 0
ABDOMINAL PAIN: 0

## 2025-08-01 NOTE — PROGRESS NOTES
Ángel Nieto is a 39 y.o. year old female here for:    Chief Complaint:    Chief Complaint   Patient presents with    Annual Exam     Subjective:    Today, her current concerns include:    Preventive Services:    Health Maintenance History:  Patient exercises regularly? Yes, daily, 60 mins each time  Diet? Tries to eat a healthy diet, no soda, lots of water  Dental: Last visit was less than 6 months ago  Glasses/Eyes: Last visit was less than 1 year ago    Other Health Maintenance History:  Patient has previous history of abnormal breast mass?: no  Patient has previous history of abnormal pap smear?:  yes in the past, and f/u were normal   Patient is on Hormone Replacement therapy or Birth Control? no  Sexually active? Yes with one male partner - monogamous  In the past two weeks have they been bothered by feeling \"down\", depressed or hopeless?  no  In the past two weeks, have they experienced a loss of interest or pleasure in doing things?  no  In the last year, have you fallen more than once or been seriously injured in a fall?  no  Advance Directives: Not in place. Provided instructions today on how to sign up/provide these on [employer].    Health Maintenance Screening:  Diabetes screening: was provided today  Cholesterol screening: was provided today  Pelvic exam and pap smear: was not performed today - UTD on this  Screening mammogram order slip: was not provided (no personal history of breast CA, family hx as noted) today  Bone Density test order: was not applicable to this patient based on their risk factors and evidence based recommendations today  Colorectal cancer screening (Screening colonoscopy) order: was not applicable to this patient based on their risk factors and evidence based recommendations (no personal or family history of colon CA) today  Lung Cancer Screen:was not applicable to this patient based on their risk factors and evidence based recommendations today  Hep C screening: was not

## 2025-08-01 NOTE — PATIENT INSTRUCTIONS
Reminder: Please call to schedule pap exam when able.      Please find our [facility] Location Guide below for your convenience!    CENTRAL LOCATIONS    1) [facility]  [address]  [place], [place] [address]  Phone: [phone]    2) [facility]  [address] [place], [place] [address]  Phone: [phone]    [facility] LOCATIONS    3) [facility]  [address]  [place], [place] [address]  Phone: [phone]    4) [facility]  [address] [place] [address]  Phone: [phone]    5) [facility]  [address] [place] [address]  Phone: [phone]    6) [facility]  [address], [address]  [place] [address]   Phone: [phone]    [place] LOCATIONS    7) [facility]  [address]  [place] [address]  Phone: [phone]    8) [facility]  [address] [place] [address]  Phone: [phone]    9) [facility]  [address] [place] [address]  Phone: [phone]    10) [facility]  [address]  [place] [address]  Phone: [phone]    [place] LOCATIONS    11) [facility]  [address]  [place] [address]  Phone: [phone]    12) [facility]  [address]  [place] [address]  Phone: [phone]    [facility] LOCATIONS    13) [facility]  [address]  [place] [address]  Phone: [phone]    14) [facility]  [address] [place] [address]  Phone: [phone]

## 2025-08-01 NOTE — ASSESSMENT & PLAN NOTE
Overall doing well. Age appropriate screenings and immunization provided as per orders below. Reminded to schedule pap exam when able.